# Patient Record
Sex: FEMALE | Race: WHITE | Employment: FULL TIME | URBAN - METROPOLITAN AREA
[De-identification: names, ages, dates, MRNs, and addresses within clinical notes are randomized per-mention and may not be internally consistent; named-entity substitution may affect disease eponyms.]

---

## 2017-04-05 PROBLEM — Z90.13 S/P BILATERAL MASTECTOMY: Status: ACTIVE | Noted: 2017-04-05

## 2017-04-05 PROBLEM — E55.9 VITAMIN D DEFICIENCY: Status: ACTIVE | Noted: 2017-04-05

## 2017-04-05 PROBLEM — Z90.722 S/P BILATERAL OOPHORECTOMY: Status: ACTIVE | Noted: 2017-04-05

## 2017-09-01 PROCEDURE — 88175 CYTOPATH C/V AUTO FLUID REDO: CPT | Performed by: OBSTETRICS & GYNECOLOGY

## 2018-09-15 PROCEDURE — 36415 COLL VENOUS BLD VENIPUNCTURE: CPT | Performed by: INTERNAL MEDICINE

## 2018-09-15 PROCEDURE — 82043 UR ALBUMIN QUANTITATIVE: CPT | Performed by: INTERNAL MEDICINE

## 2018-09-15 PROCEDURE — 82570 ASSAY OF URINE CREATININE: CPT | Performed by: INTERNAL MEDICINE

## 2019-05-06 PROBLEM — R05.9 COUGH: Status: ACTIVE | Noted: 2019-05-06

## 2019-06-04 PROBLEM — I48.0 PAROXYSMAL ATRIAL FIBRILLATION (HCC): Status: ACTIVE | Noted: 2019-06-04

## 2019-06-04 PROBLEM — F41.1 GAD (GENERALIZED ANXIETY DISORDER): Status: ACTIVE | Noted: 2019-06-04

## 2019-06-10 PROBLEM — I47.1 SVT (SUPRAVENTRICULAR TACHYCARDIA) (HCC): Status: ACTIVE | Noted: 2019-06-10

## 2019-06-10 PROBLEM — Z79.01 ANTICOAGULATED: Status: ACTIVE | Noted: 2019-06-10

## 2019-06-10 PROBLEM — Z98.890 HISTORY OF CARDIOVERSION: Status: ACTIVE | Noted: 2019-06-10

## 2019-07-23 PROBLEM — Z98.890 S/P CORONARY ANGIOGRAM: Status: ACTIVE | Noted: 2019-06-10

## 2019-08-29 PROBLEM — F41.9 ANXIETY: Status: ACTIVE | Noted: 2019-08-29

## 2019-09-16 PROBLEM — E66.3 OVERWEIGHT (BMI 25.0-29.9): Status: ACTIVE | Noted: 2019-09-16

## 2019-09-16 PROBLEM — F41.9 ANXIETY: Status: RESOLVED | Noted: 2019-08-29 | Resolved: 2019-09-16

## 2019-10-23 PROBLEM — R05.9 COUGH: Status: RESOLVED | Noted: 2019-05-06 | Resolved: 2019-10-23

## 2021-11-30 ENCOUNTER — OFFICE VISIT (OUTPATIENT)
Dept: SURGERY | Facility: CLINIC | Age: 56
End: 2021-11-30
Payer: COMMERCIAL

## 2021-11-30 VITALS
HEIGHT: 67.1 IN | DIASTOLIC BLOOD PRESSURE: 89 MMHG | SYSTOLIC BLOOD PRESSURE: 152 MMHG | BODY MASS INDEX: 28.41 KG/M2 | RESPIRATION RATE: 16 BRPM | HEART RATE: 86 BPM | OXYGEN SATURATION: 97 % | WEIGHT: 181 LBS

## 2021-11-30 DIAGNOSIS — Z15.09 BRCA1 POSITIVE: Primary | ICD-10-CM

## 2021-11-30 DIAGNOSIS — Z15.01 BRCA1 POSITIVE: Primary | ICD-10-CM

## 2021-11-30 PROCEDURE — 3008F BODY MASS INDEX DOCD: CPT | Performed by: SURGERY

## 2021-11-30 PROCEDURE — 3079F DIAST BP 80-89 MM HG: CPT | Performed by: SURGERY

## 2021-11-30 PROCEDURE — 3077F SYST BP >= 140 MM HG: CPT | Performed by: SURGERY

## 2021-11-30 PROCEDURE — 99204 OFFICE O/P NEW MOD 45 MIN: CPT | Performed by: SURGERY

## 2021-11-30 NOTE — CONSULTS
New Patient Consultation    This is the first visit for this 60year old female who presents to discuss delayed breast reconstruction. History of Present Illness:    The patient is a 60year old female who presents to discuss delayed right breast reconst ENDOMETRIAL  11/1/2010    Performed by Norris Nagel at [address] [place] [address]   • LAPAROSCOPIC REMOVAL N/A 11/14/2014    Procedure: Fernando Pederson;  Surgeon: Tanya Thomas MD;  Location: [place] [address] weight loss/gain, fertility or hormone problems, cold intolerance, heat intolerance, excessive thirst, or thyroid disease. Allergic/Immunologic:  There is no history of hives, hay fever, angioedema or anaphylaxis.     HEENT:    The patient denies ear pa fainting/black outs, dizziness, memory problems, loss of sensation/numbness, problems walking, weakness, tingling or burning in hands/feet.      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   The presenting signs and symptoms of ALCL as well as